# Patient Record
Sex: MALE | URBAN - METROPOLITAN AREA
[De-identification: names, ages, dates, MRNs, and addresses within clinical notes are randomized per-mention and may not be internally consistent; named-entity substitution may affect disease eponyms.]

---

## 2019-02-25 ENCOUNTER — HISTORICAL (OUTPATIENT)
Dept: PREADMISSION TESTING | Facility: HOSPITAL | Age: 59
End: 2019-02-25

## 2019-02-25 ENCOUNTER — HISTORICAL (OUTPATIENT)
Dept: LAB | Facility: HOSPITAL | Age: 59
End: 2019-02-25

## 2019-02-28 ENCOUNTER — HISTORICAL (OUTPATIENT)
Dept: SURGERY | Facility: HOSPITAL | Age: 59
End: 2019-02-28

## 2022-04-30 NOTE — OP NOTE
DATE OF SURGERY:    02/28/2019    SURGEON:  Farhad Woodard MD  ASSISTANT:  GUY Ramires    PREOPERATIVE DIAGNOSES:    1. Lumbar spinal stenosis with neurogenic claudication.  2. Lumbar herniated nucleus pulposus with radiculopathy.  3. Low back pain.  4. Lumbar degenerative disk disease.    POSTOPERATIVE DIAGNOSES:    1. Lumbar spinal stenosis with neurogenic claudication.  2. Lumbar herniated nucleus pulposus with radiculopathy.  3. Low back pain.  4. Lumbar degenerative disk disease.    PROCEDURES PERFORMED:    1. Right L4-5 hemilaminectomy, medial facetectomy, L5 foraminotomy with microdiskectomy.  2. Operative microscopic dissection.    INDICATIONS FOR PROCEDURE:  Mr. Bret Mendoza is a 58-year-old male who presented to my clinic with complaints of back pain, right lower extremity radiculopathy radiating down to the top of his foot.  Leg pain was relieved with forward bent posture.  Had been present for 3-5 months.  He rated his pain as 8/10 on average, 10/10 at worse.  He underwent extensive conservative therapy including physical therapy, cold/heat treatments, work modification, activity modification, and neuropathic pain medication usage.  He has done this for greater than 2 months.  Despite this, the patient had persistent severe progressive pain.  On exam the patient had 4+ out of 5 extensor hallucis longus on the right.  He also had sensory deficits in the right lower extremity in the L5 distribution.  MRI of the lumbar spine noted a herniated disk at L4-5 on the right, small in size.  Neurologic compression.  He also had central spinal canal stenosis at L4-5, moderate-to-severe.  I counseled the patient extensively on his options.  Failed 2-3 months of conservative therapy, persistent symptoms that prohibited him from working and he was eager to get back to work.  In light of his neurological deficit, I feel the patient warrants surgical decompression rather than continued conservative care.   The patient elected to go forward.    PROCEDURE IN DETAIL:  After informed consent was obtained, the patient was brought to the operating room and placed under anesthesia, intubated by the anesthesia team, transferred to the operative table in prone position, well padded, prepped and draped in the usual sterile fashion.       I began the procedure by making a dorsal midline incision fluoroscopically located over the L4-5 vertebral body level.  Dissection was carried down to the underlying dorsal lumbar fascia.  A right-sided paramedian incision was made.  Tubular dilatation occurred over the trailing edge of the L4 lamina on the right.  Fluoroscopy confirmed level.     The operative microscope was brought in where a high-speed pneumatic bur was used to perform a hemilaminectomy and a medial facetectomy.  I carried the hemilaminectomy up to the cranial edge of the yellow ligament, undermined with a 6-0 angled curet, and excised with a 2 Kerrison punch.  I identified the traversing L5 nerve root, decompressed it, and lateral recess, as well as the proximal neural foramina with 2 Kerrison punch.  Of note, there was a thickened yellow ligament causing severe lateral recess stenosis.  I then gently dissected the traversing L5 nerve root medially, identified a very small herniation underneath.  Nerve root retractor held this dissection.  An 11 blade was used to perform an annulotomy.  Pituitary rongeur was used to remove multiple small thickened disk fragments.  Once the ventral surface was deemed decompressed, the nerve was released.  Inspection noted no evidence of residual spinal canal stenosis.  Copious irrigation washed out the entirety of the incision.  Complete hemostasis was achieved.  Standard layered closure was performed.  All counts correct x2.    ESTIMATED BLOOD LOSS:  25 cc.    BLOOD REPLACED:  None.    SPECIMENS:  None.    IMPLANTS:  None.    APPARENT OPERATIVE COMPLICATIONS:   None.        ______________________________  MD SHERI Ingram  DD:  03/01/2019  Time:  09:14AM  DT:  03/01/2019  Time:  10:31AM  Job #:  [id number]